# Patient Record
Sex: MALE | Race: WHITE | Employment: FULL TIME | URBAN - METROPOLITAN AREA
[De-identification: names, ages, dates, MRNs, and addresses within clinical notes are randomized per-mention and may not be internally consistent; named-entity substitution may affect disease eponyms.]

---

## 2019-08-31 ENCOUNTER — OFFICE VISIT (OUTPATIENT)
Dept: FAMILY MEDICINE CLINIC | Age: 49
End: 2019-08-31
Payer: COMMERCIAL

## 2019-08-31 VITALS
HEART RATE: 65 BPM | OXYGEN SATURATION: 98 % | DIASTOLIC BLOOD PRESSURE: 82 MMHG | BODY MASS INDEX: 29.82 KG/M2 | TEMPERATURE: 96.4 F | RESPIRATION RATE: 14 BRPM | SYSTOLIC BLOOD PRESSURE: 110 MMHG | HEIGHT: 74 IN | WEIGHT: 232.4 LBS

## 2019-08-31 DIAGNOSIS — H66.003 ACUTE SUPPURATIVE OTITIS MEDIA OF BOTH EARS WITHOUT SPONTANEOUS RUPTURE OF TYMPANIC MEMBRANES, RECURRENCE NOT SPECIFIED: Primary | ICD-10-CM

## 2019-08-31 DIAGNOSIS — H61.23 BILATERAL IMPACTED CERUMEN: ICD-10-CM

## 2019-08-31 DIAGNOSIS — H60.393 ACUTE INFECTIVE OTITIS EXTERNA, BILATERAL: ICD-10-CM

## 2019-08-31 DIAGNOSIS — H91.93 DECREASED HEARING OF BOTH EARS: ICD-10-CM

## 2019-08-31 PROCEDURE — 99213 OFFICE O/P EST LOW 20 MIN: CPT | Performed by: FAMILY MEDICINE

## 2019-08-31 RX ORDER — PSEUDOEPHEDRINE HCL 120 MG/1
120 TABLET, FILM COATED, EXTENDED RELEASE ORAL EVERY 12 HOURS
Qty: 30 TABLET | Refills: 1 | Status: SHIPPED | OUTPATIENT
Start: 2019-08-31 | End: 2019-09-15

## 2019-08-31 RX ORDER — AMOXICILLIN 875 MG/1
875 TABLET, COATED ORAL 2 TIMES DAILY
Qty: 14 TABLET | Refills: 0 | Status: SHIPPED | OUTPATIENT
Start: 2019-08-31 | End: 2019-09-07

## 2019-08-31 ASSESSMENT — ENCOUNTER SYMPTOMS: FACIAL SWELLING: 1

## 2019-08-31 NOTE — PROGRESS NOTES
suppurative otitis media of both ears without spontaneous rupture of tympanic membranes, recurrence not specified  amoxicillin (AMOXIL) 875 MG tablet    neomycin-polymyxin-hydrocortisone (CORTISPORIN) 3.5-84032-6 otic solution    pseudoephedrine (SUDAFED 12 HOUR) 120 MG extended release tablet   2. Decreased hearing of both ears  Remove impacted ear wax    amoxicillin (AMOXIL) 875 MG tablet    neomycin-polymyxin-hydrocortisone (CORTISPORIN) 3.5-51239-7 otic solution    pseudoephedrine (SUDAFED 12 HOUR) 120 MG extended release tablet   3. Bilateral impacted cerumen  Remove impacted ear wax    amoxicillin (AMOXIL) 875 MG tablet    neomycin-polymyxin-hydrocortisone (CORTISPORIN) 3.5-54433-4 otic solution    pseudoephedrine (SUDAFED 12 HOUR) 120 MG extended release tablet   4. Acute infective otitis externa, bilateral  amoxicillin (AMOXIL) 875 MG tablet    neomycin-polymyxin-hydrocortisone (CORTISPORIN) 3.5-85576-5 otic solution    pseudoephedrine (SUDAFED 12 HOUR) 120 MG extended release tablet         Counseled regarding above diagnosis, including possible risks and complications,  especially if left uncontrolled. Counseled regarding the possible side effects, risks, benefits and alternatives to treatment; patient and/or guardian verbalizes understanding, agrees, feels comfortable with and wishes to proceed with above treatment plan. Call or go to ED immediately if symptoms worsen or persist. Advised patient to call with any new medication issues, and, as applicable, read allRx info from pharmacy to assure aware of all possible risks and side effects of medication before taking. As applicable, educational materialsand/or home exercises printed for patient's review and were included in patient instructions on his/her After Visit Summary and given to patient at the end of visit. Patient and/or guardian given opportunity to ask questions/raise concerns.   The patient verbalized comfort and understanding

## 2019-09-04 ENCOUNTER — HOSPITAL ENCOUNTER (EMERGENCY)
Age: 49
Discharge: HOME | End: 2019-09-04
Payer: COMMERCIAL

## 2019-09-04 ENCOUNTER — APPOINTMENT (OUTPATIENT)
Dept: GENERAL RADIOLOGY | Age: 49
End: 2019-09-04
Payer: COMMERCIAL

## 2019-09-04 ENCOUNTER — TELEPHONE (OUTPATIENT)
Dept: FAMILY MEDICINE CLINIC | Age: 49
End: 2019-09-04

## 2019-09-04 VITALS
HEART RATE: 74 BPM | HEIGHT: 74 IN | SYSTOLIC BLOOD PRESSURE: 107 MMHG | DIASTOLIC BLOOD PRESSURE: 71 MMHG | RESPIRATION RATE: 16 BRPM | BODY MASS INDEX: 29.52 KG/M2 | WEIGHT: 230 LBS | OXYGEN SATURATION: 96 % | TEMPERATURE: 97.6 F

## 2019-09-04 DIAGNOSIS — S89.92XA INJURY OF LEFT KNEE, INITIAL ENCOUNTER: Primary | ICD-10-CM

## 2019-09-04 DIAGNOSIS — S89.92XA ACUTE INJURY OF ANTERIOR CRUCIATE LIGAMENT, LEFT, INITIAL ENCOUNTER: ICD-10-CM

## 2019-09-04 PROCEDURE — 6370000000 HC RX 637 (ALT 250 FOR IP): Performed by: NURSE PRACTITIONER

## 2019-09-04 PROCEDURE — 99283 EMERGENCY DEPT VISIT LOW MDM: CPT

## 2019-09-04 PROCEDURE — 73564 X-RAY EXAM KNEE 4 OR MORE: CPT

## 2019-09-04 RX ORDER — IBUPROFEN 800 MG/1
800 TABLET ORAL EVERY 8 HOURS PRN
Qty: 21 TABLET | Refills: 0 | Status: SHIPPED | OUTPATIENT
Start: 2019-09-04 | End: 2020-07-20 | Stop reason: CLARIF

## 2019-09-04 RX ORDER — OXYCODONE HYDROCHLORIDE AND ACETAMINOPHEN 5; 325 MG/1; MG/1
1 TABLET ORAL EVERY 6 HOURS PRN
Qty: 10 TABLET | Refills: 0 | Status: SHIPPED | OUTPATIENT
Start: 2019-09-04 | End: 2019-09-07

## 2019-09-04 RX ORDER — OXYCODONE HYDROCHLORIDE AND ACETAMINOPHEN 5; 325 MG/1; MG/1
1 TABLET ORAL ONCE
Status: COMPLETED | OUTPATIENT
Start: 2019-09-04 | End: 2019-09-04

## 2019-09-04 RX ADMIN — OXYCODONE HYDROCHLORIDE AND ACETAMINOPHEN 1 TABLET: 5; 325 TABLET ORAL at 20:49

## 2019-09-04 ASSESSMENT — PAIN DESCRIPTION - LOCATION: LOCATION: KNEE

## 2019-09-04 ASSESSMENT — PAIN SCALES - GENERAL
PAINLEVEL_OUTOF10: 8
PAINLEVEL_OUTOF10: 8

## 2019-09-04 ASSESSMENT — PAIN DESCRIPTION - PAIN TYPE: TYPE: ACUTE PAIN

## 2019-09-04 ASSESSMENT — PAIN DESCRIPTION - ORIENTATION: ORIENTATION: LEFT

## 2019-09-05 NOTE — ED NOTES
Patient discharged with belongings. Discussed care instructions, follow-up instructions, medications and when to return to the hospital. Patient verbalizes understanding and has no further questions at this time. Electronically signed by Tara Richardson RN on 9/4/2019 at 9:13 PM       Brandan Harding RN  09/04/19 [id number]

## 2020-07-16 ENCOUNTER — TELEPHONE (OUTPATIENT)
Dept: ADMINISTRATIVE | Age: 50
End: 2020-07-16

## 2020-07-16 ENCOUNTER — NURSE TRIAGE (OUTPATIENT)
Dept: OTHER | Facility: CLINIC | Age: 50
End: 2020-07-16

## 2020-07-16 NOTE — TELEPHONE ENCOUNTER
Received call from [facility], [address], [place]. Patient called in stating he has been experiencing intermittent chest pain over the past week and constant dizziness, onset 7/11/20, see triage assessment below. Care Advice provided; patient acknowledged understanding of care advice and is in agreement with plan with modifications, states his wife will come home and take him to the ER. Please do not reply to the triage nurse through this encounter. Any subsequent communication should be directly with the patient. Reason for Disposition   Chest pain lasting longer than 5 minutes and ANY of the following:* Over 48years old* Over 27years old and at least one cardiac risk factor (i.e., high blood pressure, diabetes, high cholesterol, obesity, smoker or strong family history of heart disease)* Pain is crushing, pressure-like, or heavy * Took nitroglycerin and chest pain was not relieved* History of heart disease (i.e., angina, heart attack, bypass surgery, angioplasty, CHF)    Answer Assessment - Initial Assessment Questions  1. LOCATION: \"Where does it hurt? \"        Right of sternum  2. RADIATION: \"Does the pain go anywhere else? \" (e.g., into neck, jaw, arms, back)      Radiates up right side of neck  3. ONSET: \"When did the chest pain begin? \" (Minutes, hours or days)       7/11/20  4. PATTERN \"Does the pain come and go, or has it been constant since it started? \"  \"Does it get worse with exertion? \"       Constant, does not get worse with exertion  5. DURATION: \"How long does it last\" (e.g., seconds, minutes, hours)      Chest pain is daily, but the radiation to right neck is intermittent (occurring 2 times this week) lasting 15-20 mins  6. SEVERITY: \"How bad is the pain? \"  (e.g., Scale 1-10; mild, moderate, or severe)     - MILD (1-3): doesn't interfere with normal activities      - MODERATE (4-7): interferes with normal activities or awakens from sleep     - SEVERE (8-10): excruciating pain, unable to do any normal activities        Mild to Severe  7. CARDIAC RISK FACTORS: \"Do you have any history of heart problems or risk factors for heart disease? \" (e.g., prior heart attack, angina; high blood pressure, diabetes, being overweight, high cholesterol, smoking, or strong family history of heart disease)     Overweight, high cholesterol, occasional cigar smoker  8. PULMONARY RISK FACTORS: \"Do you have any history of lung disease? \"  (e.g., blood clots in lung, asthma, emphysema, birth control pills)      Denies any pulmonary factors  9. CAUSE: \"What do you think is causing the chest pain? \"      I don't know, could be stress, anxiety, fatigue  10. OTHER SYMPTOMS: \"Do you have any other symptoms? \" (e.g., dizziness, nausea, vomiting, sweating, fever, difficulty breathing, cough)        Dizziness, nausea  11. PREGNANCY: \"Is there any chance you are pregnant? \" \"When was your last menstrual period? \"       NA    Protocols used: CHEST PAIN-ADULT-[place]

## 2020-07-17 ENCOUNTER — TELEPHONE (OUTPATIENT)
Dept: PRIMARY CARE CLINIC | Age: 50
End: 2020-07-17

## 2020-07-20 ENCOUNTER — OFFICE VISIT (OUTPATIENT)
Dept: PRIMARY CARE CLINIC | Age: 50
End: 2020-07-20
Payer: COMMERCIAL

## 2020-07-20 VITALS
DIASTOLIC BLOOD PRESSURE: 78 MMHG | SYSTOLIC BLOOD PRESSURE: 120 MMHG | OXYGEN SATURATION: 96 % | WEIGHT: 222 LBS | TEMPERATURE: 98.1 F | BODY MASS INDEX: 28.5 KG/M2 | HEART RATE: 78 BPM

## 2020-07-20 PROBLEM — E78.5 HYPERLIPIDEMIA: Status: ACTIVE | Noted: 2020-07-20

## 2020-07-20 PROBLEM — F41.9 ANXIETY: Status: ACTIVE | Noted: 2020-07-20

## 2020-07-20 PROBLEM — R07.9 CHEST PAIN: Status: ACTIVE | Noted: 2020-07-20

## 2020-07-20 PROCEDURE — G8419 CALC BMI OUT NRM PARAM NOF/U: HCPCS | Performed by: FAMILY MEDICINE

## 2020-07-20 PROCEDURE — G8427 DOCREV CUR MEDS BY ELIG CLIN: HCPCS | Performed by: FAMILY MEDICINE

## 2020-07-20 PROCEDURE — 4004F PT TOBACCO SCREEN RCVD TLK: CPT | Performed by: FAMILY MEDICINE

## 2020-07-20 PROCEDURE — 99214 OFFICE O/P EST MOD 30 MIN: CPT | Performed by: FAMILY MEDICINE

## 2020-07-20 RX ORDER — ESCITALOPRAM OXALATE 5 MG/1
5 TABLET ORAL DAILY
Qty: 30 TABLET | Refills: 2
Start: 2020-07-20 | End: 2020-07-21 | Stop reason: SDUPTHER

## 2020-07-20 ASSESSMENT — ENCOUNTER SYMPTOMS
RESPIRATORY NEGATIVE: 1
ALLERGIC/IMMUNOLOGIC NEGATIVE: 1
GASTROINTESTINAL NEGATIVE: 1
EYES NEGATIVE: 1

## 2020-07-20 NOTE — PROGRESS NOTES
file.  No past medical history on file. Vitals:    07/20/20 1611   BP: 120/78   Pulse: 78   Temp: 98.1 °F (36.7 °C)   SpO2: 96%   Weight: 222 lb (100.7 kg)     BP Readings from Last 3 Encounters:   07/20/20 120/78   09/04/19 107/71   08/31/19 110/82        Physical Exam  Vitals signs and nursing note reviewed. Constitutional:       Appearance: He is well-developed. HENT:      Head: Normocephalic. Right Ear: External ear normal.      Left Ear: External ear normal.      Nose: Nose normal.   Eyes:      Conjunctiva/sclera: Conjunctivae normal.      Pupils: Pupils are equal, round, and reactive to light. Neck:      Musculoskeletal: Normal range of motion and neck supple. Cardiovascular:      Rate and Rhythm: Normal rate. Pulmonary:      Breath sounds: Normal breath sounds. Abdominal:      General: Bowel sounds are normal.      Palpations: Abdomen is soft. Musculoskeletal: Normal range of motion. Skin:     General: Skin is warm and dry. Neurological:      Mental Status: He is alert and oriented to person, place, and time. Psychiatric:         Behavior: Behavior normal.        Today's physical exam is stable. HEENT unremarkable. Heart remains well controlled. I will sit tight with current medications and care. Reassessment 1 month and sooner if problems. Plan Per Assessment:  Georgette Miles was seen today for follow-up from hospital and anxiety. Diagnoses and all orders for this visit:    Anxiety  -     CBC Auto Differential; Future  -     Comprehensive Metabolic Panel; Future  -     Lipid Panel; Future  -     T4; Future  -     TSH without Reflex; Future    Chest pain, unspecified type  -     CBC Auto Differential; Future  -     Comprehensive Metabolic Panel; Future  -     Lipid Panel; Future  -     T4; Future  -     TSH without Reflex; Future    Hyperlipidemia, unspecified hyperlipidemia type  -     Lipid Panel; Future    Other orders  -     escitalopram (LEXAPRO) 5 MG tablet;  Take 1 tablet by mouth daily            Return in about 4 weeks (around 8/17/2020). Garry Pang DO    Note was generated with the assistance of voice recognition software. Document was reviewed however may contain grammatical errors.

## 2020-07-21 ENCOUNTER — TELEPHONE (OUTPATIENT)
Dept: PRIMARY CARE CLINIC | Age: 50
End: 2020-07-21

## 2020-07-21 RX ORDER — ESCITALOPRAM OXALATE 5 MG/1
5 TABLET ORAL DAILY
Qty: 30 TABLET | Refills: 2 | Status: SHIPPED | OUTPATIENT
Start: 2020-07-21

## 2021-02-09 DIAGNOSIS — F41.9 ANXIETY: ICD-10-CM

## 2021-02-09 DIAGNOSIS — E78.5 HYPERLIPIDEMIA, UNSPECIFIED HYPERLIPIDEMIA TYPE: ICD-10-CM

## 2021-02-09 DIAGNOSIS — R07.9 CHEST PAIN, UNSPECIFIED TYPE: ICD-10-CM

## 2021-02-09 LAB
ALBUMIN SERPL-MCNC: NORMAL G/DL
ALP BLD-CCNC: NORMAL U/L
ALT SERPL-CCNC: NORMAL U/L
ANION GAP SERPL CALCULATED.3IONS-SCNC: NORMAL MMOL/L
AST SERPL-CCNC: NORMAL U/L
BASOPHILS ABSOLUTE: NORMAL
BASOPHILS RELATIVE PERCENT: NORMAL
BILIRUB SERPL-MCNC: NORMAL MG/DL
BUN BLDV-MCNC: NORMAL MG/DL
CALCIUM SERPL-MCNC: NORMAL MG/DL
CHLORIDE BLD-SCNC: NORMAL MMOL/L
CHOLESTEROL, TOTAL: 190 MG/DL
CHOLESTEROL/HDL RATIO: 3.3
CO2: NORMAL
CREAT SERPL-MCNC: NORMAL MG/DL
EOSINOPHILS ABSOLUTE: NORMAL
EOSINOPHILS RELATIVE PERCENT: NORMAL
GFR CALCULATED: NORMAL
GLUCOSE BLD-MCNC: NORMAL MG/DL
HCT VFR BLD CALC: NORMAL %
HDLC SERPL-MCNC: 57 MG/DL (ref 35–70)
HEMOGLOBIN: NORMAL
LDL CHOLESTEROL CALCULATED: 114 MG/DL (ref 0–160)
LYMPHOCYTES ABSOLUTE: NORMAL
LYMPHOCYTES RELATIVE PERCENT: NORMAL
MCH RBC QN AUTO: NORMAL PG
MCHC RBC AUTO-ENTMCNC: NORMAL G/DL
MCV RBC AUTO: NORMAL FL
MONOCYTES ABSOLUTE: NORMAL
MONOCYTES RELATIVE PERCENT: NORMAL
NEUTROPHILS ABSOLUTE: NORMAL
NEUTROPHILS RELATIVE PERCENT: NORMAL
NONHDLC SERPL-MCNC: 133 MG/DL
PDW BLD-RTO: NORMAL %
PLATELET # BLD: NORMAL 10*3/UL
PMV BLD AUTO: NORMAL FL
POTASSIUM SERPL-SCNC: NORMAL MMOL/L
RBC # BLD: NORMAL 10*6/UL
SODIUM BLD-SCNC: NORMAL MMOL/L
TOTAL PROTEIN: NORMAL
TRIGL SERPL-MCNC: 87 MG/DL
TSH SERPL DL<=0.05 MIU/L-ACNC: 0.99 UIU/ML
VLDLC SERPL CALC-MCNC: NORMAL MG/DL
WBC # BLD: NORMAL 10*3/UL

## 2022-11-07 ENCOUNTER — OFFICE VISIT (OUTPATIENT)
Dept: FAMILY MEDICINE CLINIC | Age: 52
End: 2022-11-07
Payer: COMMERCIAL

## 2022-11-07 VITALS
TEMPERATURE: 97.8 F | OXYGEN SATURATION: 97 % | SYSTOLIC BLOOD PRESSURE: 122 MMHG | HEART RATE: 115 BPM | WEIGHT: 235 LBS | DIASTOLIC BLOOD PRESSURE: 76 MMHG | BODY MASS INDEX: 30.16 KG/M2 | HEIGHT: 74 IN

## 2022-11-07 DIAGNOSIS — H61.23 BILATERAL IMPACTED CERUMEN: ICD-10-CM

## 2022-11-07 DIAGNOSIS — Z12.5 PROSTATE CANCER SCREENING: ICD-10-CM

## 2022-11-07 DIAGNOSIS — J34.89 FRONTAL SINUS PAIN: ICD-10-CM

## 2022-11-07 DIAGNOSIS — R11.0 NAUSEA: Primary | ICD-10-CM

## 2022-11-07 DIAGNOSIS — Z00.00 ANNUAL PHYSICAL EXAM: ICD-10-CM

## 2022-11-07 LAB
INFLUENZA A ANTIBODY: NORMAL
INFLUENZA B ANTIBODY: NORMAL
Lab: NORMAL
PERFORMING INSTRUMENT: NORMAL
QC PASS/FAIL: NORMAL
SARS-COV-2, POC: NORMAL

## 2022-11-07 PROCEDURE — G8417 CALC BMI ABV UP PARAM F/U: HCPCS | Performed by: FAMILY MEDICINE

## 2022-11-07 PROCEDURE — 87426 SARSCOV CORONAVIRUS AG IA: CPT | Performed by: FAMILY MEDICINE

## 2022-11-07 PROCEDURE — 87804 INFLUENZA ASSAY W/OPTIC: CPT | Performed by: FAMILY MEDICINE

## 2022-11-07 PROCEDURE — 1036F TOBACCO NON-USER: CPT | Performed by: FAMILY MEDICINE

## 2022-11-07 PROCEDURE — 99214 OFFICE O/P EST MOD 30 MIN: CPT | Performed by: FAMILY MEDICINE

## 2022-11-07 PROCEDURE — G8427 DOCREV CUR MEDS BY ELIG CLIN: HCPCS | Performed by: FAMILY MEDICINE

## 2022-11-07 PROCEDURE — 3017F COLORECTAL CA SCREEN DOC REV: CPT | Performed by: FAMILY MEDICINE

## 2022-11-07 PROCEDURE — G8484 FLU IMMUNIZE NO ADMIN: HCPCS | Performed by: FAMILY MEDICINE

## 2022-11-07 PROCEDURE — 69210 REMOVE IMPACTED EAR WAX UNI: CPT | Performed by: FAMILY MEDICINE

## 2022-11-07 RX ORDER — PREDNISONE 1 MG/1
TABLET ORAL
Qty: 30 TABLET | Refills: 0 | Status: SHIPPED | OUTPATIENT
Start: 2022-11-07

## 2022-11-07 RX ORDER — AMOXICILLIN AND CLAVULANATE POTASSIUM 875; 125 MG/1; MG/1
1 TABLET, FILM COATED ORAL 2 TIMES DAILY
Qty: 20 TABLET | Refills: 0 | Status: SHIPPED | OUTPATIENT
Start: 2022-11-07 | End: 2022-11-17

## 2022-11-07 SDOH — ECONOMIC STABILITY: FOOD INSECURITY: WITHIN THE PAST 12 MONTHS, THE FOOD YOU BOUGHT JUST DIDN'T LAST AND YOU DIDN'T HAVE MONEY TO GET MORE.: NEVER TRUE

## 2022-11-07 SDOH — ECONOMIC STABILITY: FOOD INSECURITY: WITHIN THE PAST 12 MONTHS, YOU WORRIED THAT YOUR FOOD WOULD RUN OUT BEFORE YOU GOT MONEY TO BUY MORE.: NEVER TRUE

## 2022-11-07 ASSESSMENT — PATIENT HEALTH QUESTIONNAIRE - PHQ9
SUM OF ALL RESPONSES TO PHQ QUESTIONS 1-9: 0
2. FEELING DOWN, DEPRESSED OR HOPELESS: 0
SUM OF ALL RESPONSES TO PHQ QUESTIONS 1-9: 0
1. LITTLE INTEREST OR PLEASURE IN DOING THINGS: 0
SUM OF ALL RESPONSES TO PHQ9 QUESTIONS 1 & 2: 0

## 2022-11-07 ASSESSMENT — ENCOUNTER SYMPTOMS
SINUS PAIN: 1
ALLERGIC/IMMUNOLOGIC NEGATIVE: 1
RESPIRATORY NEGATIVE: 1
GASTROINTESTINAL NEGATIVE: 1
EYES NEGATIVE: 1
SINUS PRESSURE: 1

## 2022-11-07 ASSESSMENT — SOCIAL DETERMINANTS OF HEALTH (SDOH): HOW HARD IS IT FOR YOU TO PAY FOR THE VERY BASICS LIKE FOOD, HOUSING, MEDICAL CARE, AND HEATING?: NOT HARD AT ALL

## 2022-11-07 NOTE — PROGRESS NOTES
22     Xochilt Rao    : 1970 Sex: male   Age: 50 y.o. Chief Complaint   Patient presents with    Sinus Problem     Started yesterday with fatigue, dizziness and nausea, getting over sinus infection       Nausea    Dizziness       Prior to Admission medications    Medication Sig Start Date End Date Taking? Authorizing Provider   amoxicillin-clavulanate (AUGMENTIN) 875-125 MG per tablet Take 1 tablet by mouth 2 times daily for 10 days 22 Yes Quentin Pereyra DO   predniSONE (DELTASONE) 5 MG tablet 4 tablets daily for 3 days then 3 tablets daily for 3 days then 2 tablets daily for 3 days then 1 tablet daily for 3 days 22  Yes Aide Buenrostro DO          HPI: Seen today some problems frontal sinus pressure drainage mild headache. Mild cough congestion. COVID testing negative. Has been vaccinated. Treatment with Augmentin prednisone today. Persistent to follow-up. Bilateral cerumen impaction irrigation today. Anxiety has been well controlled no longer on the Lexapro and doing very well. Review of Systems   Constitutional: Negative. HENT:  Positive for congestion, sinus pressure and sinus pain. Bilateral cerumen impaction. Eyes: Negative. Respiratory: Negative. Gastrointestinal: Negative. Endocrine: Negative. Genitourinary: Negative. Musculoskeletal: Negative. Skin: Negative. Allergic/Immunologic: Negative. Neurological: Negative. Hematological: Negative. Psychiatric/Behavioral: Negative.               Current Outpatient Medications:     amoxicillin-clavulanate (AUGMENTIN) 875-125 MG per tablet, Take 1 tablet by mouth 2 times daily for 10 days, Disp: 20 tablet, Rfl: 0    predniSONE (DELTASONE) 5 MG tablet, 4 tablets daily for 3 days then 3 tablets daily for 3 days then 2 tablets daily for 3 days then 1 tablet daily for 3 days, Disp: 30 tablet, Rfl: 0    No Known Allergies    Social History     Tobacco Use    Smoking status: Former     Packs/day: 0.25     Types: Cigarettes     Quit date: 2016     Years since quittin.8    Smokeless tobacco: Former     Types: Chew   Substance Use Topics    Alcohol use: No    Drug use: No      No past surgical history on file. No family history on file. No past medical history on file. Vitals:    22 1053   BP: 122/76   Pulse: (!) 115   Temp: 97.8 °F (36.6 °C)   SpO2: 97%   Weight: 235 lb (106.6 kg)   Height: 6' 2\" (1.88 m)     BP Readings from Last 3 Encounters:   22 122/76   20 120/78   19 107/71        Physical Exam  Vitals and nursing note reviewed. Constitutional:       Appearance: He is well-developed. HENT:      Head: Normocephalic. Right Ear: External ear normal.      Left Ear: External ear normal.      Nose: Nose normal.   Eyes:      Conjunctiva/sclera: Conjunctivae normal.      Pupils: Pupils are equal, round, and reactive to light. Cardiovascular:      Rate and Rhythm: Normal rate. Pulmonary:      Breath sounds: Normal breath sounds. Abdominal:      General: Bowel sounds are normal.      Palpations: Abdomen is soft. Musculoskeletal:         General: Normal range of motion. Cervical back: Normal range of motion and neck supple. Skin:     General: Skin is warm and dry. Neurological:      Mental Status: He is alert and oriented to person, place, and time. Psychiatric:         Behavior: Behavior normal.      Today's vitals physical examination stable. Heart is controlled lungs are clear. Bilateral cerumen impactions present. Treatment as noted for the sinuses and then if persistent problems follow-up. Anxiety well controlled. 3-month follow-up for physical.          Plan Per Assessment:  Isaak Langford was seen today for sinus problem, nausea and dizziness.     Diagnoses and all orders for this visit:    Nausea  -     POCT COVID-19, Antigen  -     POCT Influenza A/B    Frontal sinus pain    Bilateral impacted cerumen    Other orders  - amoxicillin-clavulanate (AUGMENTIN) 875-125 MG per tablet; Take 1 tablet by mouth 2 times daily for 10 days  -     predniSONE (DELTASONE) 5 MG tablet; 4 tablets daily for 3 days then 3 tablets daily for 3 days then 2 tablets daily for 3 days then 1 tablet daily for 3 days          No follow-ups on file. Filiberto Mojica DO    Note was generated with the assistance of voice recognition software. Document was reviewed however may contain grammatical errors.

## 2023-02-06 DIAGNOSIS — Z12.5 PROSTATE CANCER SCREENING: ICD-10-CM

## 2023-02-06 DIAGNOSIS — Z00.00 ANNUAL PHYSICAL EXAM: ICD-10-CM

## 2023-02-06 LAB
ALBUMIN SERPL-MCNC: 4.3 G/DL (ref 3.5–5.2)
ALP BLD-CCNC: 88 U/L (ref 40–129)
ALT SERPL-CCNC: 24 U/L (ref 0–40)
ANION GAP SERPL CALCULATED.3IONS-SCNC: 12 MMOL/L (ref 7–16)
AST SERPL-CCNC: 22 U/L (ref 0–39)
BASOPHILS ABSOLUTE: 0.03 E9/L (ref 0–0.2)
BASOPHILS RELATIVE PERCENT: 0.5 % (ref 0–2)
BILIRUB SERPL-MCNC: <0.2 MG/DL (ref 0–1.2)
BUN BLDV-MCNC: 23 MG/DL (ref 6–20)
CALCIUM SERPL-MCNC: 9.2 MG/DL (ref 8.6–10.2)
CHLORIDE BLD-SCNC: 107 MMOL/L (ref 98–107)
CHOLESTEROL, TOTAL: 192 MG/DL (ref 0–199)
CO2: 23 MMOL/L (ref 22–29)
CREAT SERPL-MCNC: 1 MG/DL (ref 0.7–1.2)
EOSINOPHILS ABSOLUTE: 0.32 E9/L (ref 0.05–0.5)
EOSINOPHILS RELATIVE PERCENT: 5.7 % (ref 0–6)
GFR SERPL CREATININE-BSD FRML MDRD: >60 ML/MIN/1.73
GLUCOSE BLD-MCNC: 112 MG/DL (ref 74–99)
HCT VFR BLD CALC: 46.8 % (ref 37–54)
HDLC SERPL-MCNC: 50 MG/DL
HEMOGLOBIN: 14.4 G/DL (ref 12.5–16.5)
IMMATURE GRANULOCYTES #: 0.01 E9/L
IMMATURE GRANULOCYTES %: 0.2 % (ref 0–5)
LDL CHOLESTEROL CALCULATED: 125 MG/DL (ref 0–99)
LYMPHOCYTES ABSOLUTE: 1.68 E9/L (ref 1.5–4)
LYMPHOCYTES RELATIVE PERCENT: 30 % (ref 20–42)
MCH RBC QN AUTO: 28.1 PG (ref 26–35)
MCHC RBC AUTO-ENTMCNC: 30.8 % (ref 32–34.5)
MCV RBC AUTO: 91.4 FL (ref 80–99.9)
MONOCYTES ABSOLUTE: 0.52 E9/L (ref 0.1–0.95)
MONOCYTES RELATIVE PERCENT: 9.3 % (ref 2–12)
NEUTROPHILS ABSOLUTE: 3.04 E9/L (ref 1.8–7.3)
NEUTROPHILS RELATIVE PERCENT: 54.3 % (ref 43–80)
PDW BLD-RTO: 14.2 FL (ref 11.5–15)
PLATELET # BLD: 229 E9/L (ref 130–450)
PMV BLD AUTO: 11.1 FL (ref 7–12)
POTASSIUM SERPL-SCNC: 4.3 MMOL/L (ref 3.5–5)
PROSTATE SPECIFIC ANTIGEN: 1.37 NG/ML (ref 0–4)
RBC # BLD: 5.12 E12/L (ref 3.8–5.8)
SODIUM BLD-SCNC: 142 MMOL/L (ref 132–146)
T4 TOTAL: 7.7 MCG/DL (ref 4.5–11.7)
TOTAL PROTEIN: 7.5 G/DL (ref 6.4–8.3)
TRIGL SERPL-MCNC: 87 MG/DL (ref 0–149)
TSH SERPL DL<=0.05 MIU/L-ACNC: 1.73 UIU/ML (ref 0.27–4.2)
VLDLC SERPL CALC-MCNC: 17 MG/DL
WBC # BLD: 5.6 E9/L (ref 4.5–11.5)

## 2023-02-14 ENCOUNTER — OFFICE VISIT (OUTPATIENT)
Dept: PRIMARY CARE CLINIC | Age: 53
End: 2023-02-14

## 2023-02-14 VITALS
DIASTOLIC BLOOD PRESSURE: 80 MMHG | OXYGEN SATURATION: 97 % | SYSTOLIC BLOOD PRESSURE: 116 MMHG | HEIGHT: 74 IN | WEIGHT: 231 LBS | TEMPERATURE: 98 F | BODY MASS INDEX: 29.65 KG/M2 | HEART RATE: 73 BPM

## 2023-02-14 DIAGNOSIS — Z12.11 COLON CANCER SCREENING: ICD-10-CM

## 2023-02-14 DIAGNOSIS — Z87.898 HISTORY OF SNORING: ICD-10-CM

## 2023-02-14 DIAGNOSIS — E78.5 HYPERLIPIDEMIA, UNSPECIFIED HYPERLIPIDEMIA TYPE: ICD-10-CM

## 2023-02-14 DIAGNOSIS — Z00.00 ANNUAL PHYSICAL EXAM: Primary | ICD-10-CM

## 2023-02-14 DIAGNOSIS — G47.30 SLEEP APNEA, UNSPECIFIED TYPE: ICD-10-CM

## 2023-02-14 DIAGNOSIS — F41.9 ANXIETY: ICD-10-CM

## 2023-02-14 LAB
BILIRUBIN, POC: NORMAL
BLOOD URINE, POC: NORMAL
CLARITY, POC: CLEAR
COLOR, POC: YELLOW
GLUCOSE URINE, POC: NORMAL
KETONES, POC: NORMAL
LEUKOCYTE EST, POC: NORMAL
NITRITE, POC: NORMAL
PH, POC: 5
PROTEIN, POC: NORMAL
SPECIFIC GRAVITY, POC: >=1.03
UROBILINOGEN, POC: 0.2

## 2023-02-14 ASSESSMENT — PATIENT HEALTH QUESTIONNAIRE - PHQ9
1. LITTLE INTEREST OR PLEASURE IN DOING THINGS: 0
SUM OF ALL RESPONSES TO PHQ QUESTIONS 1-9: 0
SUM OF ALL RESPONSES TO PHQ QUESTIONS 1-9: 0
2. FEELING DOWN, DEPRESSED OR HOPELESS: 0
SUM OF ALL RESPONSES TO PHQ QUESTIONS 1-9: 0
SUM OF ALL RESPONSES TO PHQ QUESTIONS 1-9: 0
SUM OF ALL RESPONSES TO PHQ9 QUESTIONS 1 & 2: 0

## 2023-02-14 ASSESSMENT — ENCOUNTER SYMPTOMS
GASTROINTESTINAL NEGATIVE: 1
ALLERGIC/IMMUNOLOGIC NEGATIVE: 1
EYES NEGATIVE: 1
RESPIRATORY NEGATIVE: 1

## 2023-02-14 NOTE — PROGRESS NOTES
23     Jazmyn Ao    : 1970 Sex: male   Age: 50 y.o. Chief Complaint   Patient presents with    Annual Exam      Pt here for physical. Wants to talk about urinary frequency and snoring. Prior to Admission medications    Not on File          HPI: Patient evaluated this morning complete physical.  Overall medically has been stable. Some generalized anxiety but prefers no medication at this time. We would consider BuSpar in the future if needed. Hyperlipidemia very stable diet and exercise only. History of chronic snoring and questionable sleep apnea and will arrange for sleep study. Health maintenance in need of colonoscopy and referral made today. Some prostate issues mild BPH symptoms and encouraged saw palmetto and if persistent we would try Flomax and urology consult if needed. PSA was excellent. Review of Systems   Constitutional: Negative. HENT: Negative. Eyes: Negative. Respiratory: Negative. Gastrointestinal: Negative. Endocrine: Negative. Genitourinary: Negative. Musculoskeletal: Negative. Skin: Negative. Allergic/Immunologic: Negative. Neurological: Negative. Hematological: Negative. Psychiatric/Behavioral: Negative. Today systems review stable medications as prescribed. No current outpatient medications on file. No Known Allergies    Social History     Tobacco Use    Smoking status: Former     Packs/day: 0.25     Types: Cigarettes     Quit date: 2016     Years since quittin.1    Smokeless tobacco: Former     Types: Chew   Substance Use Topics    Alcohol use: No    Drug use: No      No past surgical history on file. No family history on file. No past medical history on file.     Vitals:    23 0653   BP: 116/80   Pulse: 73   Temp: 98 °F (36.7 °C)   SpO2: 97%   Weight: 231 lb (104.8 kg)   Height: 6' 2\" (1.88 m)     BP Readings from Last 3 Encounters:   23 116/80   22 122/76   20 120/78 Physical Exam  Vitals and nursing note reviewed. Constitutional:       Appearance: He is well-developed. HENT:      Head: Normocephalic. Right Ear: External ear normal.      Left Ear: External ear normal.      Nose: Nose normal.   Eyes:      Conjunctiva/sclera: Conjunctivae normal.      Pupils: Pupils are equal, round, and reactive to light. Cardiovascular:      Rate and Rhythm: Normal rate. Pulmonary:      Breath sounds: Normal breath sounds. Abdominal:      General: Bowel sounds are normal.      Palpations: Abdomen is soft. Musculoskeletal:         General: Normal range of motion. Cervical back: Normal range of motion and neck supple. Skin:     General: Skin is warm and dry. Neurological:      Mental Status: He is alert and oriented to person, place, and time. Psychiatric:         Behavior: Behavior normal.      Today's vitals physical exam stable. Heart was regular lungs are clear. Medications as prescribed. Reassessment next 6 to 8 weeks and sooner if problems. Arrange for GI referral and sleep study evaluation. And then further discussion when he returns. EKG reviewed sinus rhythm no acute changes. Medically otherwise stable. UA unremarkable. Plan Per Assessment:  Lisa Gale was seen today for annual exam.    Diagnoses and all orders for this visit:    Annual physical exam  -     EKG 12 Lead  -     POCT Urinalysis no Micro    Anxiety    Hyperlipidemia, unspecified hyperlipidemia type    Colon cancer screening  -     Amb External Referral To Gastroenterology    History of snoring    Sleep apnea, unspecified type  -     David Best , [place] , Sleep Medicine, [address] [place] [address]          Return in about 2 months (around 4/14/2023). Donya Carter DO    Note was generated with the assistance of voice recognition software. Document was reviewed however may contain grammatical errors. Detail Level: Detailed Additional Notes: Used ResurFx on her sebaceous hyperplasia 70j 400/cm2 5mm [place] 25 pulses.

## 2023-03-13 NOTE — PROGRESS NOTES
Telemedicine    Bridgette Carvajal is a 50 y.o. male being evaluated by a Virtual Visit (video visit) encounter to address concerns as mentioned below. A caregiver was present when appropriate. Due to this being a TeleHealth encounter (During XDEJS-81 public health emergency), evaluation of the following organ systems was limited: Vitals/Constitutional/EENT/Resp/CV/GI//MS/Neuro/Skin/Heme-Lymph-Imm. Pursuant to the emergency declaration under the [facility] 1135 waiver authority and the [employer] [facility] [employer] [id number], this Virtual Visit was conducted with patient's (and/or legal guardian's) consent, to reduce the patient's risk of exposure to COVID-19 and provide necessary medical care. The patient (and/or legal guardian) has also been advised to contact this office for worsening conditions or problems, and seek emergency medical treatment and/or call 911 if deemed necessary. The patient (and/or legal guardian if applicable) is aware that this is a billable service, which includes applicable co-pays. This virtual visit was conducted with patient's (and/or legal guardian's) consent. Services were provided through a video synchronous discussion virtually to substitute for in-person clinic visit. Patient and provider were both  located remotely. Patient identification was confirmed by 2 forms of personal forms of identification (Birthday and Patient's address). The patient was located in a state where the provider was licensed to provide care. YOB: 1970  Address: [address]  [place] [phone] were provided through a video synchronous discussion virtually to substitute for in-person clinic visit. An electronic signature was used to authenticate this note.   Start time: 11:08 am  End time: 11: 30 am        [facility] Sleep Medicine    Patient Name: Champ Kuo  Age: 52 y.o.   : 1970    Date of Visit: 3/15/23      HPI   Champ Kuo is a 52 y.o. male with  has no past medical history on file. He presents as a new patient to Sleep Clinic, referred by Dr. Pereyra, for suspected sleep apnea.    Patient presents to establish with sleep medicine, referred by his PCP for suspected sleep apnea.  He has never had a sleep study in the past.  His wife has been telling him that he has been snoring over the last several years and hence requests the evaluation.  Patient states his wife has never told him that he stops breathing during the night, he denies waking up with headaches but does feel tired and fatigue throughout the day.  He feels like he \"drags \"and does not feel rested upon awakening.  Despite the fatigue, he generally does not nap during the day.  He admits to some mild memory issues but denies drowsy driving.    Patient goes to bed around midnight and wakes around 6:45 AM during the week and later on the weekends.  He does not take sleeping medications and typically falls asleep within 30 minutes.    Patient sleeps on his sides but does feel restless throughout the night.  He usually wakes up once through the night to use the restroom but can fall back asleep quickly.    Patient drinks 1 to 3 cups of caffeinated coffee a day and has a rare soda on occasion.    His weight remained stable this past year.      Sleep Study History: No previous sleep study.    Bed time: midnight     Wake time: 6:45 am- weekends sleeping in until     Sleep Latency (min): <30, sometimes if he has a lot on his mind, difficulty    Sleep Medications: None  Awakenings:  Once usually    / bathroom  / falls back asleep quickly   Daytime Naps: No   Sleep disturbances: None  Sleep Location: Bed  Sleep environment: Sleeps on sides, moves a lot     SLEEP HYGIENE    Caffeine:  1-3 cups in the am   Coffee    rare   Soda    0   Tea  Alcohol: rare  Tobacco:n        Sleep ROS:    Snoring: Y   Witnessed apneas: N  AM Headache: N  Dry Mouth: Y  Daytime Sleepiness: Y  Difficulty remembering things: Mild  MVA  or near miss accident due to sleepiness in the past? N  Tonsillectomy? N  Have you lost or gained weight recently? Stable       PARASOMNIAS/ NARCOLEPSY:  Hypnogogic/Hynopompic Hallucinations: N   Sleep paralysis: Rare  Cataplexy: N   REM behavior symptoms: N  Sleep Walking: N  RLS Symptoms: N    STOP-BANG score of 4/8 for  (+)snoring, (+)daytime fatigue, (-)observed apneas, (-)high blood pressure, (-)BMI>35, (+)age >50, (-)neck circumference >15in, (+)male gender      Sleep Medicine 3/15/2023   Sitting and reading 2   Watching TV 2   Sitting, inactive in a public place (e.g. a theatre or a meeting) 0   As a passenger in a car for an hour without a break 1   Lying down to rest in the afternoon when circumstances permit 2   Sitting and talking to someone 0   Sitting quietly after a lunch without alcohol 1   In a car, while stopped for a few minutes in traffic 1   [place] Sleepiness Score 9         PMH:  History reviewed. No pertinent past medical history. PSH:  History reviewed. No pertinent surgical history. Soc Hx:  Social History     Tobacco Use    Smoking status: Former     Packs/day: 0.25     Types: Cigarettes     Quit date: 2016     Years since quittin.2    Smokeless tobacco: Former     Types: Chew   Substance Use Topics    Alcohol use: No    Drug use: No        Fam Hx:  History reviewed. No pertinent family history. No current outpatient medications on file. No current facility-administered medications for this visit. Review of Systems  (Sleep ROS above)     Constitutional: no chills, no fever   Eyes: no blurred vision  Gastrointestinal:  no nausea,  no vomiting, no diarrhea. Neurological:  no dizziness,  no headache, no memory changes,  and no tingling.    Endocrine: No feelings of weakness    Objective:   Physical Exam  There were no vitals taken for this visit. There were no vitals filed for this visit. General: No acute distress. BMI of There is no height or weight on file to calculate BMI. HEENT: Normocephalic, atraumatic. No oropharyngeal lesions. Neck: Trachea midline  Lungs: Able to speak in full sentences  Neurologic: Normal gait. Balance intact. Psychiatric: Alert and oriented. Appropriate affect. PERTINENT LAB RESULTS  TSH   Date Value Ref Range Status   02/06/2023 1.730 0.270 - 4.200 uIU/mL Final      No results found for: FERRITIN         Assessment:      Bella Klinefelter was seen today for sleep apnea. Diagnoses and all orders for this visit:    Obstructive sleep apnea  -     Baseline Diagnostic Sleep Study; Future     Plan:      Cathie Morris is a 50 y.o. male with  has no past medical history on file. He presents as a new patient to Sleep Clinic with symptoms suggestive of suspected obstructive sleep apnea (primarily snoring and daytime fatigue). An in lab/home sleep study will be performed. 1. Obstructive Sleep Apnea     -Multiple risk factors with STOP-BANG 4/8. Will proceed with in-lab split-night polysomnography. Order placed today for [facility].   -Discussed pathophysiology of JULIANNE and its impact on daily well-being, as well as cardiometabolic and neurocognitive health (particularly in moderate-severe cases). -Discussed CPAP as first-line and gold-standard therapy for JULIANNE should diagnosis be confirmed.   -Patient would like to follow up in office after sleep study is completed to further discuss options and to review sleep study.  -Informed patient to call office if he does not hear from sleep lab about scheduling within 1 to 2 weeks. Return in about 2 months (around 5/15/2023) for Sleep Study Results.     VIJAY [facility]  [address] [facility]  P -[phone] option 2  R- [phone]

## 2023-03-15 ENCOUNTER — TELEPHONE (OUTPATIENT)
Dept: SLEEP CENTER | Age: 53
End: 2023-03-15

## 2023-03-15 ENCOUNTER — TELEMEDICINE (OUTPATIENT)
Dept: SLEEP CENTER | Age: 53
End: 2023-03-15
Payer: COMMERCIAL

## 2023-03-15 DIAGNOSIS — G47.33 OBSTRUCTIVE SLEEP APNEA: Primary | ICD-10-CM

## 2023-03-15 PROCEDURE — 99243 OFF/OP CNSLTJ NEW/EST LOW 30: CPT | Performed by: NURSE PRACTITIONER

## 2023-03-15 PROCEDURE — G8427 DOCREV CUR MEDS BY ELIG CLIN: HCPCS | Performed by: NURSE PRACTITIONER

## 2023-03-15 ASSESSMENT — SLEEP AND FATIGUE QUESTIONNAIRES
HOW LIKELY ARE YOU TO NOD OFF OR FALL ASLEEP WHILE SITTING QUIETLY AFTER LUNCH WITHOUT ALCOHOL: 1
HOW LIKELY ARE YOU TO NOD OFF OR FALL ASLEEP WHEN YOU ARE A PASSENGER IN A CAR FOR AN HOUR WITHOUT A BREAK: 1
HOW LIKELY ARE YOU TO NOD OFF OR FALL ASLEEP WHILE WATCHING TV: 2
HOW LIKELY ARE YOU TO NOD OFF OR FALL ASLEEP WHILE SITTING INACTIVE IN A PUBLIC PLACE: 0
HOW LIKELY ARE YOU TO NOD OFF OR FALL ASLEEP WHILE SITTING AND READING: 2
HOW LIKELY ARE YOU TO NOD OFF OR FALL ASLEEP IN A CAR, WHILE STOPPED FOR A FEW MINUTES IN TRAFFIC: 1
ESS TOTAL SCORE: 9
HOW LIKELY ARE YOU TO NOD OFF OR FALL ASLEEP WHILE SITTING AND TALKING TO SOMEONE: 0
HOW LIKELY ARE YOU TO NOD OFF OR FALL ASLEEP WHILE LYING DOWN TO REST IN THE AFTERNOON WHEN CIRCUMSTANCES PERMIT: 2

## 2023-03-16 PROBLEM — Z12.11 COLON CANCER SCREENING: Status: RESOLVED | Noted: 2023-02-14 | Resolved: 2023-03-16

## 2023-03-16 NOTE — TELEPHONE ENCOUNTER
Call to pt to schedule a f/u appt.  LM with call back #
pending SLp evaluation

## 2023-07-21 ENCOUNTER — HOSPITAL ENCOUNTER (EMERGENCY)
Age: 53
Discharge: HOME | End: 2023-07-22
Payer: COMMERCIAL

## 2023-07-21 ENCOUNTER — APPOINTMENT (OUTPATIENT)
Dept: GENERAL RADIOLOGY | Age: 53
End: 2023-07-21
Payer: COMMERCIAL

## 2023-07-21 VITALS
DIASTOLIC BLOOD PRESSURE: 92 MMHG | HEART RATE: 93 BPM | WEIGHT: 240 LBS | SYSTOLIC BLOOD PRESSURE: 126 MMHG | OXYGEN SATURATION: 98 % | HEIGHT: 74 IN | BODY MASS INDEX: 30.8 KG/M2 | TEMPERATURE: 98.1 F | RESPIRATION RATE: 16 BRPM

## 2023-07-21 DIAGNOSIS — S93.402A SPRAIN OF LEFT ANKLE, UNSPECIFIED LIGAMENT, INITIAL ENCOUNTER: Primary | ICD-10-CM

## 2023-07-21 DIAGNOSIS — S46.911A SHOULDER STRAIN, RIGHT, INITIAL ENCOUNTER: ICD-10-CM

## 2023-07-21 DIAGNOSIS — S33.2XXA CLOSED SUBLUXATION OF COCCYX, INITIAL ENCOUNTER: ICD-10-CM

## 2023-07-21 DIAGNOSIS — S09.90XA MINOR HEAD INJURY WITHOUT LOSS OF CONSCIOUSNESS, INITIAL ENCOUNTER: ICD-10-CM

## 2023-07-21 PROCEDURE — 99284 EMERGENCY DEPT VISIT MOD MDM: CPT

## 2023-07-21 PROCEDURE — 6370000000 HC RX 637 (ALT 250 FOR IP): Performed by: PHYSICIAN ASSISTANT

## 2023-07-21 RX ORDER — IBUPROFEN 800 MG/1
800 TABLET ORAL ONCE
Status: COMPLETED | OUTPATIENT
Start: 2023-07-21 | End: 2023-07-21

## 2023-07-21 RX ADMIN — IBUPROFEN 800 MG: 800 TABLET, FILM COATED ORAL at 23:37

## 2023-07-21 ASSESSMENT — PAIN DESCRIPTION - DESCRIPTORS: DESCRIPTORS: SHARP

## 2023-07-21 ASSESSMENT — PAIN DESCRIPTION - ORIENTATION: ORIENTATION: LEFT

## 2023-07-21 ASSESSMENT — PAIN DESCRIPTION - LOCATION: LOCATION: ANKLE;SHOULDER

## 2023-07-21 ASSESSMENT — PAIN SCALES - GENERAL: PAINLEVEL_OUTOF10: 7

## 2023-07-21 ASSESSMENT — PAIN - FUNCTIONAL ASSESSMENT: PAIN_FUNCTIONAL_ASSESSMENT: 0-10

## 2023-07-22 ENCOUNTER — APPOINTMENT (OUTPATIENT)
Dept: CT IMAGING | Age: 53
End: 2023-07-22
Payer: COMMERCIAL

## 2023-07-22 ENCOUNTER — APPOINTMENT (OUTPATIENT)
Dept: GENERAL RADIOLOGY | Age: 53
End: 2023-07-22
Payer: COMMERCIAL

## 2023-07-22 PROCEDURE — 73610 X-RAY EXAM OF ANKLE: CPT

## 2023-07-22 PROCEDURE — 73030 X-RAY EXAM OF SHOULDER: CPT

## 2023-07-22 PROCEDURE — 72220 X-RAY EXAM SACRUM TAILBONE: CPT

## 2023-07-22 PROCEDURE — 70450 CT HEAD/BRAIN W/O DYE: CPT

## 2023-07-22 RX ORDER — IBUPROFEN 600 MG/1
600 TABLET ORAL EVERY 6 HOURS PRN
Qty: 20 TABLET | Refills: 0 | Status: SHIPPED | OUTPATIENT
Start: 2023-07-22 | End: 2023-07-22 | Stop reason: SDUPTHER

## 2023-07-22 RX ORDER — IBUPROFEN 600 MG/1
600 TABLET ORAL EVERY 6 HOURS PRN
Qty: 20 TABLET | Refills: 0 | Status: SHIPPED | OUTPATIENT
Start: 2023-07-22 | End: 2023-07-27

## 2023-07-22 NOTE — ED PROVIDER NOTES
Independent ANDRES Visit. [address] [facility]  Department of Emergency Medicine   ED  Encounter Note  Admit Date/RoomTime: 2023 11:13 PM  ED Room:     NAME: Gaudencio Ellis  : 1970  MRN: [id number]     Chief Complaint:  Ankle Pain (Fall today around 1430; complaints of left ankle pain.), Shoulder Pain (Right shoulder pain), and Headache (Hit head during fall, no thinners, denies LOC. Complaints of slight headache.)    History of Present Illness       Gaudencio Ellis is a 50 y.o. old male who presents to the emergency department by private vehicle, for a mechanical fall which occured several hour(s) prior to arrival. He reportedly missed a small step going into his garage while at home prior to incident with complaints of left ankle swelling and pain, tailbone pain, right shoulder pain, and posterior head pain from hitting his head. He reports he fell on his bottom and hit head secondary to this on the door behind him. The patients tetanus status is up to date. Since onset the symptoms have been gradually worsening, particularly ankle. His pain is aggraveated by pressure on or palpation of painful area, any attempt to bear weight, or sitting and relieved by nothing and he took tylenol. He denies any loss of consciousness, confusion, dizziness, neck pain, chest pain, abdominal pain, numbness, weakness, blurred vision, nausea, or vomiting. He takes no blood thinning agents. ROS   Pertinent positives and negatives are stated within HPI, all other systems reviewed and are negative. Past Medical History:  has no past medical history on file. Surgical History:  has no past surgical history on file. Social History:  reports that he quit smoking about 7 years ago. His smoking use included cigarettes. He smoked an average of .25 packs per day. He has quit using smokeless tobacco.  His smokeless tobacco use included chew.  He reports that he does not drink alcohol and does not use